# Patient Record
Sex: MALE | Race: BLACK OR AFRICAN AMERICAN | Employment: UNEMPLOYED | URBAN - METROPOLITAN AREA
[De-identification: names, ages, dates, MRNs, and addresses within clinical notes are randomized per-mention and may not be internally consistent; named-entity substitution may affect disease eponyms.]

---

## 2024-06-08 ENCOUNTER — APPOINTMENT (OUTPATIENT)
Dept: CT IMAGING | Age: 44
End: 2024-06-08
Payer: COMMERCIAL

## 2024-06-08 ENCOUNTER — APPOINTMENT (OUTPATIENT)
Dept: GENERAL RADIOLOGY | Age: 44
End: 2024-06-08
Payer: COMMERCIAL

## 2024-06-08 ENCOUNTER — HOSPITAL ENCOUNTER (EMERGENCY)
Age: 44
Discharge: ANOTHER HOSPITAL | End: 2024-06-09
Payer: COMMERCIAL

## 2024-06-08 DIAGNOSIS — S02.82XA CLOSED FRACTURE OF LEFT ZYGOMATICOMAXILLARY COMPLEX, INITIAL ENCOUNTER (HCC): ICD-10-CM

## 2024-06-08 DIAGNOSIS — S02.32XA CLOSED BLOW-OUT FRACTURE OF LEFT ORBITAL FLOOR (HCC): ICD-10-CM

## 2024-06-08 DIAGNOSIS — S02.40DA CLOSED FRACTURE OF LEFT ZYGOMATICOMAXILLARY COMPLEX, INITIAL ENCOUNTER (HCC): ICD-10-CM

## 2024-06-08 DIAGNOSIS — S02.32XA CLOSED FRACTURE OF LEFT ZYGOMATICOMAXILLARY COMPLEX, INITIAL ENCOUNTER (HCC): ICD-10-CM

## 2024-06-08 DIAGNOSIS — Z71.89 GOALS OF CARE, COUNSELING/DISCUSSION: ICD-10-CM

## 2024-06-08 DIAGNOSIS — J18.9 PNEUMONIA OF RIGHT LUNG DUE TO INFECTIOUS ORGANISM, UNSPECIFIED PART OF LUNG: ICD-10-CM

## 2024-06-08 DIAGNOSIS — S02.40FA CLOSED FRACTURE OF LEFT ZYGOMATICOMAXILLARY COMPLEX, INITIAL ENCOUNTER (HCC): ICD-10-CM

## 2024-06-08 DIAGNOSIS — R55 SYNCOPE AND COLLAPSE: Primary | ICD-10-CM

## 2024-06-08 LAB
ALBUMIN SERPL-MCNC: 4.4 G/DL (ref 3.4–5)
ALBUMIN/GLOB SERPL: 1.1 {RATIO} (ref 1.1–2.2)
ALP SERPL-CCNC: 102 U/L (ref 40–129)
ALT SERPL-CCNC: 14 U/L (ref 10–40)
ANION GAP SERPL CALCULATED.3IONS-SCNC: 13 MMOL/L (ref 3–16)
AST SERPL-CCNC: 20 U/L (ref 15–37)
BASOPHILS # BLD: 0.1 K/UL (ref 0–0.2)
BASOPHILS NFR BLD: 0.5 %
BILIRUB SERPL-MCNC: 0.3 MG/DL (ref 0–1)
BUN SERPL-MCNC: 18 MG/DL (ref 7–20)
CALCIUM SERPL-MCNC: 9.3 MG/DL (ref 8.3–10.6)
CHLORIDE SERPL-SCNC: 99 MMOL/L (ref 99–110)
CK SERPL-CCNC: 120 U/L (ref 39–308)
CO2 SERPL-SCNC: 24 MMOL/L (ref 21–32)
CREAT SERPL-MCNC: 0.8 MG/DL (ref 0.9–1.3)
DEPRECATED RDW RBC AUTO: 13.2 % (ref 12.4–15.4)
EOSINOPHIL # BLD: 0.1 K/UL (ref 0–0.6)
EOSINOPHIL NFR BLD: 1 %
GFR SERPLBLD CREATININE-BSD FMLA CKD-EPI: >90 ML/MIN/{1.73_M2}
GLUCOSE SERPL-MCNC: 113 MG/DL (ref 70–99)
HCT VFR BLD AUTO: 47.6 % (ref 40.5–52.5)
HGB BLD-MCNC: 15.8 G/DL (ref 13.5–17.5)
LYMPHOCYTES # BLD: 2.2 K/UL (ref 1–5.1)
LYMPHOCYTES NFR BLD: 19.9 %
MCH RBC QN AUTO: 31.3 PG (ref 26–34)
MCHC RBC AUTO-ENTMCNC: 33.2 G/DL (ref 31–36)
MCV RBC AUTO: 94.1 FL (ref 80–100)
MONOCYTES # BLD: 1.4 K/UL (ref 0–1.3)
MONOCYTES NFR BLD: 12.2 %
NEUTROPHILS # BLD: 7.5 K/UL (ref 1.7–7.7)
NEUTROPHILS NFR BLD: 66.4 %
PLATELET # BLD AUTO: 283 K/UL (ref 135–450)
PMV BLD AUTO: 7.8 FL (ref 5–10.5)
POTASSIUM SERPL-SCNC: 4 MMOL/L (ref 3.5–5.1)
PROT SERPL-MCNC: 8.5 G/DL (ref 6.4–8.2)
RBC # BLD AUTO: 5.06 M/UL (ref 4.2–5.9)
SODIUM SERPL-SCNC: 136 MMOL/L (ref 136–145)
TROPONIN, HIGH SENSITIVITY: 7 NG/L (ref 0–22)
WBC # BLD AUTO: 11.2 K/UL (ref 4–11)

## 2024-06-08 PROCEDURE — 96375 TX/PRO/DX INJ NEW DRUG ADDON: CPT

## 2024-06-08 PROCEDURE — 93005 ELECTROCARDIOGRAM TRACING: CPT | Performed by: NURSE PRACTITIONER

## 2024-06-08 PROCEDURE — 6360000002 HC RX W HCPCS: Performed by: NURSE PRACTITIONER

## 2024-06-08 PROCEDURE — 73070 X-RAY EXAM OF ELBOW: CPT

## 2024-06-08 PROCEDURE — 6370000000 HC RX 637 (ALT 250 FOR IP): Performed by: NURSE PRACTITIONER

## 2024-06-08 PROCEDURE — 73552 X-RAY EXAM OF FEMUR 2/>: CPT

## 2024-06-08 PROCEDURE — 85025 COMPLETE CBC W/AUTO DIFF WBC: CPT

## 2024-06-08 PROCEDURE — 84484 ASSAY OF TROPONIN QUANT: CPT

## 2024-06-08 PROCEDURE — 80053 COMPREHEN METABOLIC PANEL: CPT

## 2024-06-08 PROCEDURE — 96365 THER/PROPH/DIAG IV INF INIT: CPT

## 2024-06-08 PROCEDURE — 70486 CT MAXILLOFACIAL W/O DYE: CPT

## 2024-06-08 PROCEDURE — 82550 ASSAY OF CK (CPK): CPT

## 2024-06-08 PROCEDURE — 71045 X-RAY EXAM CHEST 1 VIEW: CPT

## 2024-06-08 PROCEDURE — 99285 EMERGENCY DEPT VISIT HI MDM: CPT

## 2024-06-08 PROCEDURE — 2580000003 HC RX 258: Performed by: NURSE PRACTITIONER

## 2024-06-08 RX ORDER — IBUPROFEN 600 MG/1
600 TABLET ORAL ONCE
Status: COMPLETED | OUTPATIENT
Start: 2024-06-08 | End: 2024-06-08

## 2024-06-08 RX ORDER — FENTANYL CITRATE 50 UG/ML
50 INJECTION, SOLUTION INTRAMUSCULAR; INTRAVENOUS ONCE
Status: COMPLETED | OUTPATIENT
Start: 2024-06-08 | End: 2024-06-08

## 2024-06-08 RX ORDER — ACETAMINOPHEN 500 MG
1000 TABLET ORAL ONCE
Status: COMPLETED | OUTPATIENT
Start: 2024-06-08 | End: 2024-06-08

## 2024-06-08 RX ADMIN — IBUPROFEN 600 MG: 600 TABLET, FILM COATED ORAL at 19:28

## 2024-06-08 RX ADMIN — FENTANYL CITRATE 50 MCG: 50 INJECTION INTRAMUSCULAR; INTRAVENOUS at 20:07

## 2024-06-08 RX ADMIN — CEFTRIAXONE 1000 MG: 1 INJECTION, POWDER, FOR SOLUTION INTRAMUSCULAR; INTRAVENOUS at 23:51

## 2024-06-08 RX ADMIN — ACETAMINOPHEN 1000 MG: 500 TABLET ORAL at 19:28

## 2024-06-08 RX ADMIN — AZITHROMYCIN MONOHYDRATE 500 MG: 500 INJECTION, POWDER, LYOPHILIZED, FOR SOLUTION INTRAVENOUS at 23:56

## 2024-06-08 ASSESSMENT — PAIN SCALES - GENERAL
PAINLEVEL_OUTOF10: 4
PAINLEVEL_OUTOF10: 10
PAINLEVEL_OUTOF10: 8
PAINLEVEL_OUTOF10: 9

## 2024-06-08 ASSESSMENT — PAIN - FUNCTIONAL ASSESSMENT: PAIN_FUNCTIONAL_ASSESSMENT: 0-10

## 2024-06-08 ASSESSMENT — PAIN DESCRIPTION - DESCRIPTORS: DESCRIPTORS: ACHING

## 2024-06-08 ASSESSMENT — PAIN DESCRIPTION - ORIENTATION: ORIENTATION: LEFT

## 2024-06-08 ASSESSMENT — PAIN DESCRIPTION - LOCATION: LOCATION: HEAD;ARM;LEG

## 2024-06-09 VITALS
WEIGHT: 121.03 LBS | OXYGEN SATURATION: 100 % | HEIGHT: 69 IN | TEMPERATURE: 97.4 F | BODY MASS INDEX: 17.93 KG/M2 | DIASTOLIC BLOOD PRESSURE: 88 MMHG | RESPIRATION RATE: 18 BRPM | HEART RATE: 99 BPM | SYSTOLIC BLOOD PRESSURE: 124 MMHG

## 2024-06-09 NOTE — ED NOTES
Pt anxious and asking for a \"wrap to support his left arm\".  Pt supplied with a new ice pack, warming blankets, dimmed lighting to promote rest and an ace wrap around left arm at this time.

## 2024-06-09 NOTE — ED PROVIDER NOTES
contusion, sprain/strain, dislocation, neurovascular injury, other.      Olivia Baker is a 44 y.o. nontoxic, well-appearing male who presents to the emergency department status post he was walking outside and experiences syncope and collapse.  He fell onto his left side and struck his left face on the ground.  Patient complains of left facial pain rated severity of 7/10 described as \"aching, left femur pain also aching rated severity of 5/10, and left elbow pain rated severity of 6/10.  Patient denies any prodrome prior to syncope.  Specifically denies chest pain, nausea, vomiting, lightness, dizziness, presyncope, fever, chills, sweats, headache, body aches, other symptoms/concerns.  Will obtain CXR, XR left elbow, XR left femur, CT facial bones, troponin, CBC, CMP, CK, and EKG.  Workup pending.  Patient medicated below.        Patient was given the following medications:  Medications   fentaNYL (SUBLIMAZE) injection 50 mcg (50 mcg IntraVENous Given 6/8/24 2007)   ibuprofen (ADVIL;MOTRIN) tablet 600 mg (600 mg Oral Given 6/8/24 1928)   acetaminophen (TYLENOL) tablet 1,000 mg (1,000 mg Oral Given 6/8/24 1928)   cefTRIAXone (ROCEPHIN) 1,000 mg in sterile water 10 mL IV syringe (1,000 mg IntraVENous Given 6/8/24 2351)   azithromycin (ZITHROMAX) 500 mg in 250 mL addavial (500 mg IntraVENous New Bag 6/8/24 2356)       Workup Reveals:  CBC: Mild leukocytosis with WBC of 11.2, monocytes absolute elevated at 1.4 and otherwise unremarkable  Metabolic panel: Hyperglycemic at 130 mg/dL with no renal dysfunction or electrolyte derangement, protein elevated 8.5 and otherwise unremarkable  Troponin: WNL@7  CK: WNL@120  XR elbow: Interpreted by me confirmed by radiology to  No acute osseous or soft tissue abnormality  XR left femur: As interpreted by me confirmed by radiology identifying no acute osseous abnormality  CXR: As noted above identified right basilar infiltrate concerning for pneumonia  CT facial bones: As noted above

## 2024-06-09 NOTE — ED TRIAGE NOTES
Patient to ED c/o falling, unsure how. Patient anxious. C/o facial, left shoulder/arm and left leg pain. Patient with swelling to left face and bleeding from mouth.    Patient

## 2024-06-10 LAB
EKG ATRIAL RATE: 77 BPM
EKG DIAGNOSIS: NORMAL
EKG P AXIS: 66 DEGREES
EKG P-R INTERVAL: 204 MS
EKG Q-T INTERVAL: 372 MS
EKG QRS DURATION: 76 MS
EKG QTC CALCULATION (BAZETT): 420 MS
EKG R AXIS: 13 DEGREES
EKG T AXIS: 39 DEGREES
EKG VENTRICULAR RATE: 77 BPM

## 2024-06-10 PROCEDURE — 93010 ELECTROCARDIOGRAM REPORT: CPT | Performed by: INTERNAL MEDICINE

## 2024-06-12 ENCOUNTER — HOSPITAL ENCOUNTER (EMERGENCY)
Age: 44
Discharge: HOME | End: 2024-06-13
Attending: STUDENT IN AN ORGANIZED HEALTH CARE EDUCATION/TRAINING PROGRAM
Payer: COMMERCIAL

## 2024-06-12 DIAGNOSIS — R04.2 SPITTING BLOOD: Primary | ICD-10-CM

## 2024-06-12 PROCEDURE — 99283 EMERGENCY DEPT VISIT LOW MDM: CPT

## 2024-06-12 RX ORDER — ECHINACEA PURPUREA EXTRACT 125 MG
1 TABLET ORAL PRN
Qty: 1 EACH | Refills: 3 | Status: SHIPPED | OUTPATIENT
Start: 2024-06-12

## 2024-06-12 ASSESSMENT — LIFESTYLE VARIABLES
HOW MANY STANDARD DRINKS CONTAINING ALCOHOL DO YOU HAVE ON A TYPICAL DAY: PATIENT DOES NOT DRINK
HOW OFTEN DO YOU HAVE A DRINK CONTAINING ALCOHOL: NEVER

## 2024-06-12 ASSESSMENT — PAIN - FUNCTIONAL ASSESSMENT: PAIN_FUNCTIONAL_ASSESSMENT: NONE - DENIES PAIN

## 2024-06-13 VITALS
HEIGHT: 69 IN | HEART RATE: 99 BPM | RESPIRATION RATE: 16 BRPM | WEIGHT: 121.25 LBS | DIASTOLIC BLOOD PRESSURE: 103 MMHG | TEMPERATURE: 98 F | SYSTOLIC BLOOD PRESSURE: 145 MMHG | OXYGEN SATURATION: 99 % | BODY MASS INDEX: 17.96 KG/M2

## 2024-06-13 ASSESSMENT — PAIN - FUNCTIONAL ASSESSMENT: PAIN_FUNCTIONAL_ASSESSMENT: NONE - DENIES PAIN

## 2024-06-13 NOTE — DISCHARGE INSTRUCTIONS
Please follow-up with primary care doctor.  Please use nasal saline drops for nose.  Please use humidifier

## 2024-06-13 NOTE — ED NOTES
Patient has been cleared for discharge from the Emergency Department. Departure condition assessed as good, with the patient able to ambulate. Discharge instructions thoroughly reviewed, emphasizing follow-up care, pain management, and medication regimen. Patient demonstrated clear understanding of instructions.

## 2024-06-13 NOTE — ED NOTES
Rn tried to call the [facility] are in no one answer, Rn called all phone number listed with the pt no number is working. Charge nurse set a transport to the  [facility] are in. Pt was sent with Left.

## 2024-06-15 NOTE — ED PROVIDER NOTES
Health     Financial Resource Strain: Not on file   Food Insecurity: Not on file   Transportation Needs: Not on file   Physical Activity: Not on file   Stress: Not on file   Social Connections: Not on file   Intimate Partner Violence: Not on file   Housing Stability: Not on file     No current facility-administered medications for this encounter.     Current Outpatient Medications   Medication Sig Dispense Refill    sodium chloride (SALINE MIST) 0.65 % nasal spray 1 spray by Nasal route as needed for Congestion 1 each 3     No Known Allergies      PHYSICAL EXAM  ED Triage Vitals [06/12/24 2002]   BP Temp Temp Source Pulse Respirations SpO2 Height Weight - Scale   124/89 98 °F (36.7 °C) Oral 99 16 99 % 1.75 m (5' 8.9\") 55 kg (121 lb 4.1 oz)     General:  well appearing, NAD  Eyes: No scleral icterus. Sclera non-injected.  HEENT: Normocephalic.  Slight ecchymosis to left periorbital region.  Subconjunctival hemorrhage in left eye  Neck:  No meningismus, no LAD.  CV: RRR, No murmurs or rubs.  Resp: Clear to auscultation bilaterally. Normal respiratory effort.    GI: Soft, nontender to palpation. Nondistended.   MSK: No deformity, moving all extremities.  Skin:  No systemic rashes or lesions appreciated.  Neuro: Fluent speech. Symmetric facies. Answers questions appropriately. Normal gait.   Psych: Appropriate affect, appropriate mood, cooperative.     LABS  I have reviewed all labs for this visit.   No results found for this visit on 06/12/24.      RADIOLOGY  I have reviewed all radiographic studies for this visit.   No orders to display          My ECG interpretation   N/A    ED COURSE/MDM    44 y.o. male presenting to the ED for bleeding from mouth.  Patient is hemodynamically stable upon arrival to the emergency department.  Patient is afebrile.  There is no visible trauma or bleeding from patient's mouth.  Patient states prior to ED arrival he spit out a small amount of blood.  Patient was concerned about the  bleeding so he requested his SNF transport him to the emergency department so he can be evaluated.  Patient denies change in vision, headache, nausea, vomiting, coughing.  Patient recently fell and was fully cleared by  trauma surgery, ophthalmology, and ENT.  Patient was shown to have left orbital blowout fracture without any entrapment.  Due to recent trauma it is possible that patient's blood that he spit up could be secondary to posterior nosebleed.  Patient does not have increased work of breathing and patient does not describe a scenario where he coughed up blood.  Low suspicion for hematemesis given patient's history as well.  Patient will be provided.  Currently patient does not have any active bleeding from nose or mouth.  Patient will be provided nasal saline drops to hydrate nasal mucosa to reduce possible nosebleeds.  Patient was discharged back to facility.      - Chronic Conditions:  None    - Records reviewed: None    - Consults: None     - Pertinent social determinants: none    - Tests considered/Risk stratification tools: none    - Disposition consideration: Appropriate for outpatient management    Is this patient to be included in the SEP-1 Core Measure?  No   Exclusion criteria - the patient is NOT to be included for SEP-1 Core Measure due to:  2+ SIRS criteria are not met    IWilfrido MD, am the primary clinician of record.     During the patient's ED course, the patient was given:  Medications - No data to display     Patient was given prescriptions for the following medications. I counseled the patient as to how to take these medications.  Discharge Medication List as of 6/13/2024 12:02 AM        START taking these medications    Details   sodium chloride (SALINE MIST) 0.65 % nasal spray 1 spray by Nasal route as needed for Congestion, Disp-1 each, R-3Print             Patient instructed to follow up with:   Your PCP    Call   for follow up      All questions were answered and the